# Patient Record
Sex: FEMALE | Race: WHITE | NOT HISPANIC OR LATINO | Employment: FULL TIME | URBAN - METROPOLITAN AREA
[De-identification: names, ages, dates, MRNs, and addresses within clinical notes are randomized per-mention and may not be internally consistent; named-entity substitution may affect disease eponyms.]

---

## 2019-06-06 ENCOUNTER — OFFICE VISIT (OUTPATIENT)
Dept: URGENT CARE | Facility: CLINIC | Age: 29
End: 2019-06-06
Payer: COMMERCIAL

## 2019-06-06 VITALS
OXYGEN SATURATION: 100 % | HEIGHT: 62 IN | WEIGHT: 116 LBS | BODY MASS INDEX: 21.35 KG/M2 | SYSTOLIC BLOOD PRESSURE: 88 MMHG | HEART RATE: 67 BPM | DIASTOLIC BLOOD PRESSURE: 66 MMHG | RESPIRATION RATE: 16 BRPM | TEMPERATURE: 100 F

## 2019-06-06 DIAGNOSIS — J06.9 ACUTE URI: Primary | ICD-10-CM

## 2019-06-06 PROCEDURE — 99203 OFFICE O/P NEW LOW 30 MIN: CPT | Performed by: PHYSICIAN ASSISTANT

## 2020-09-21 ENCOUNTER — AMB VIDEO VISIT (OUTPATIENT)
Dept: OTHER | Facility: HOSPITAL | Age: 30
End: 2020-09-21

## 2020-09-21 PROCEDURE — EVISIT: Performed by: SPECIALIST

## 2020-09-22 NOTE — CARE ANYWHERE EVISITS
Visit Summary for [facility] MICH Noble - Gender: Female - Date of Birth: [id number]  Date: [id number] - Duration: 4 minutes  Patient: Hector Noble  Provider: Krystyna Chang    Patient Contact Information  Address  [address]   [place]; [address] [facility] [address]  [phone]    Visit Topics  I believe I have a UTI [Added By: Self - 2020-09-21]    Triage Questions   What is your current physical address in the event of a medical emergency? Answer []  Are you allergic to any medications? Answer []  Are you now or could you be pregnant? Answer []  Do you have any immune system compromise or chronic lung   disease? Answer []  Do you have any vulnerable family members in the home (infant, pregnant, cancer, elderly)? Answer []     Conversation Transcripts  [0A][0A] [Notification] Katie Larios, Global Staff, will help you prepare for your visit  He is assisting Krystyna Chang Family Physician [0A][Bharathi Yo] Betito, and thank you for connecting  While you are waiting for the doctor, are there any   questions I can answer for you about our service? Please contact customer service if you have questions about billing, insurance, or technical issues  Visits work best with a stable WiFi connection, so please make sure you are connected before we   begin [0A][Notification] Katie Larios has left the room  [0A][Notification] You are connected with [facility] [0A][Notification] [facility] [place]  [0A][Notification] [facility]   [facility]  Natalia Garcia  [0A][Notification] Krystyna Chang has added a prescription [0A][Notification] Krystyna Chang has added a diagnosis/procedure code  [0A][Notification] Krystyna Cahng has added a diagnosis/procedure code  [0A]    Diagnosis  Urinary tract infection, site not specified    Procedures  Value: [id number] Code: CPT-4 ONLINE E/M BY PHYS/QHP    Medications Prescribed    Macrobid  Dose : 1 capsule  Route : oral  Frequency : 2 times a day  Until directed to stop  Patient Instructions : 1 tab po bid with food for 5d  Refills : 0  Instructions to the Pharmacist : generic  thank you  Substitutions allowed      Provider Notes  [0A][0A] [0A]We strongly encourage you to share the following record of today's visit with your primary care physician  [0A][0A][0A][0A]Contact phone number  [phone][0A][0A]Mode of communication: [place],  mobile[0A][0A]HPI: Patient reports symptoms of   a UTI including: Burning, frequency and urgency of urination  Day 2[0A]hydrating well[0A] some gross hematuria  [0A] No fever, nausea or back pain or severe  abdominal pain  [0A]No vaginal discharge  [0A]  [0A]PMH:  healthy woman[0A]has a PCP and   GYN[0A][0A]Previous HX UTIs: 2-3 years ago[0A][0A]PSH:  [0A][0A]Meds: OCP[0A][0A]Allergies: NKDA[0A][0A]LMP:[0A]alert in NAD[0A]118 lbs[0A]MMM[0A]no CVAt to self tapping[0A][0A][0A][0A]Assessment:  Urinary Tract Infection[0A][0A][0A][0A]Plan:  educated   about nature of this illness and typical course[0A][0A]1  Medication as described below  [0A][0A]2  Discussed options and precautions[0A][0A]3  Recommend increased fluids, cranberry juice[0A][0A]4  You may consider using the over-the-counter medication   called phenazopyridine (the commercial name can be "Azo",  "Pyridium" or "Uristat") for your urinary pain  Please note, this medication can cause urine to be red/orange in color and could discolor contact lenses - so be aware of that   possibility [0A][0A][0A][0A]Antibiotic indication: uncomplicated ZK[9U][5O]MJNSZUCTCR chosen for the following reason: first line, macrobid 100 mg with food 2 x a d for 5d[0A]eat yogurt daily  and watch sun exposure when on ABX  if you use probiotics,   you can restart them after you have finished your antibiotic course, thank you! [0A][0A][0A][0A]Follow-up:  [0A][0A]1  Follow up in 2-3 days if not improving - see her PCP as discussed[0A][0A]2   If a medication was prescribed and there are any questions or problems with the prescription, please call [phone] for assistance  [0A][0A]3  If your symptoms are worse in any way (i e  you develop fever, vomiting, back pain, difficulty urinating) or if your symptoms are not improving after being on   antibiotics for 48 hours, then you will need to seek in person care either with your primary care provider or at your closest urgent care clinic [0A][0A]5  Please print a copy of this note and provide it to your regular doctor so it may be included in   your medical record  [0A][0A][0A][0A]Patient voiced understanding and agreement with plan [0A][0A]Please be sure to see your PCP on an annual basis   [0A]    Electronically signed by: Doc Mock [phone])